# Patient Record
Sex: MALE | Employment: FULL TIME | URBAN - NONMETROPOLITAN AREA
[De-identification: names, ages, dates, MRNs, and addresses within clinical notes are randomized per-mention and may not be internally consistent; named-entity substitution may affect disease eponyms.]

---

## 2019-06-21 LAB
ALBUMIN SERPL-MCNC: 4.7 G/DL
ALP BLD-CCNC: 45 U/L
ALT SERPL-CCNC: 12 U/L
ANION GAP SERPL CALCULATED.3IONS-SCNC: 7 MMOL/L
AST SERPL-CCNC: 21 U/L
BASOPHILS ABSOLUTE: 0.1 /ΜL
BASOPHILS RELATIVE PERCENT: 1.1 %
BILIRUB SERPL-MCNC: 2 MG/DL (ref 0.1–1.4)
BUN BLDV-MCNC: 8 MG/DL
CALCIUM SERPL-MCNC: 9.7 MG/DL
CHLORIDE BLD-SCNC: 106 MMOL/L
CHOLESTEROL, TOTAL: 133 MG/DL
CHOLESTEROL/HDL RATIO: 2
CO2: 28 MMOL/L
CREAT SERPL-MCNC: 0.91 MG/DL
EOSINOPHILS ABSOLUTE: 0.2 /ΜL
EOSINOPHILS RELATIVE PERCENT: 4.1 %
GFR CALCULATED: ABNORMAL
GLUCOSE BLD-MCNC: 85 MG/DL
HCT VFR BLD CALC: 40.2 % (ref 41–53)
HDLC SERPL-MCNC: 68 MG/DL (ref 35–70)
HEMOGLOBIN: 13.9 G/DL (ref 13.5–17.5)
LDL CHOLESTEROL CALCULATED: 56 MG/DL (ref 0–160)
LYMPHOCYTES ABSOLUTE: 1.4 /ΜL
LYMPHOCYTES RELATIVE PERCENT: 31.8 %
MCH RBC QN AUTO: 30.7 PG
MCHC RBC AUTO-ENTMCNC: 34.6 G/DL
MCV RBC AUTO: 88.7 FL
MONOCYTES ABSOLUTE: 0.4 /ΜL
MONOCYTES RELATIVE PERCENT: 9.8 %
NEUTROPHILS ABSOLUTE: 2.3 /ΜL
NEUTROPHILS RELATIVE PERCENT: 53 %
PDW BLD-RTO: 12.8 %
PLATELET # BLD: 213 K/ΜL
PMV BLD AUTO: ABNORMAL FL
POTASSIUM SERPL-SCNC: 5 MMOL/L
RBC # BLD: 4.53 10^6/ΜL
SODIUM BLD-SCNC: 141 MMOL/L
TOTAL PROTEIN: 7.3
TRIGL SERPL-MCNC: 43 MG/DL
VLDLC SERPL CALC-MCNC: 9 MG/DL
WBC # BLD: 4.4 10^3/ML

## 2019-11-19 ENCOUNTER — OFFICE VISIT (OUTPATIENT)
Dept: UROLOGY | Age: 29
End: 2019-11-19
Payer: COMMERCIAL

## 2019-11-19 VITALS
BODY MASS INDEX: 23.84 KG/M2 | DIASTOLIC BLOOD PRESSURE: 68 MMHG | HEIGHT: 73 IN | WEIGHT: 179.9 LBS | SYSTOLIC BLOOD PRESSURE: 110 MMHG

## 2019-11-19 DIAGNOSIS — N52.9 ERECTILE DYSFUNCTION, UNSPECIFIED ERECTILE DYSFUNCTION TYPE: Primary | ICD-10-CM

## 2019-11-19 LAB
BILIRUBIN URINE: NEGATIVE
BLOOD URINE, POC: NEGATIVE
CHARACTER, URINE: CLEAR
COLOR, URINE: YELLOW
GLUCOSE URINE: NEGATIVE MG/DL
KETONES, URINE: NEGATIVE
LEUKOCYTE CLUMPS, URINE: NEGATIVE
NITRITE, URINE: NEGATIVE
PH, URINE: 7.5 (ref 5–9)
PROTEIN, URINE: NEGATIVE MG/DL
SPECIFIC GRAVITY, URINE: 1.01 (ref 1–1.03)
UROBILINOGEN, URINE: 0.2 EU/DL (ref 0–1)

## 2019-11-19 PROCEDURE — 81003 URINALYSIS AUTO W/O SCOPE: CPT | Performed by: UROLOGY

## 2019-11-19 PROCEDURE — 99204 OFFICE O/P NEW MOD 45 MIN: CPT | Performed by: UROLOGY

## 2019-11-19 PROCEDURE — G8420 CALC BMI NORM PARAMETERS: HCPCS | Performed by: UROLOGY

## 2019-11-19 PROCEDURE — G8484 FLU IMMUNIZE NO ADMIN: HCPCS | Performed by: UROLOGY

## 2019-11-19 PROCEDURE — G8427 DOCREV CUR MEDS BY ELIG CLIN: HCPCS | Performed by: UROLOGY

## 2019-11-19 PROCEDURE — 1036F TOBACCO NON-USER: CPT | Performed by: UROLOGY

## 2019-11-19 RX ORDER — SILDENAFIL 100 MG/1
100 TABLET, FILM COATED ORAL DAILY PRN
Qty: 30 TABLET | Refills: 3 | Status: SHIPPED | OUTPATIENT
Start: 2019-11-19 | End: 2021-03-11

## 2019-12-17 ENCOUNTER — OFFICE VISIT (OUTPATIENT)
Dept: UROLOGY | Age: 29
End: 2019-12-17
Payer: COMMERCIAL

## 2019-12-17 VITALS
WEIGHT: 185.8 LBS | BODY MASS INDEX: 24.63 KG/M2 | DIASTOLIC BLOOD PRESSURE: 60 MMHG | HEIGHT: 73 IN | SYSTOLIC BLOOD PRESSURE: 116 MMHG

## 2019-12-17 DIAGNOSIS — N52.9 ERECTILE DYSFUNCTION, UNSPECIFIED ERECTILE DYSFUNCTION TYPE: Primary | ICD-10-CM

## 2019-12-17 LAB
BILIRUBIN URINE: NEGATIVE
BLOOD URINE, POC: NEGATIVE
CHARACTER, URINE: CLEAR
COLOR, URINE: YELLOW
GLUCOSE URINE: NEGATIVE MG/DL
KETONES, URINE: NEGATIVE
LEUKOCYTE CLUMPS, URINE: NEGATIVE
NITRITE, URINE: NEGATIVE
PH, URINE: 7 (ref 5–9)
PROTEIN, URINE: NEGATIVE MG/DL
SPECIFIC GRAVITY, URINE: 1.01 (ref 1–1.03)
UROBILINOGEN, URINE: 0.2 EU/DL (ref 0–1)

## 2019-12-17 PROCEDURE — 1036F TOBACCO NON-USER: CPT | Performed by: UROLOGY

## 2019-12-17 PROCEDURE — G8484 FLU IMMUNIZE NO ADMIN: HCPCS | Performed by: UROLOGY

## 2019-12-17 PROCEDURE — G8420 CALC BMI NORM PARAMETERS: HCPCS | Performed by: UROLOGY

## 2019-12-17 PROCEDURE — 99212 OFFICE O/P EST SF 10 MIN: CPT | Performed by: UROLOGY

## 2019-12-17 PROCEDURE — G8427 DOCREV CUR MEDS BY ELIG CLIN: HCPCS | Performed by: UROLOGY

## 2019-12-17 PROCEDURE — 81003 URINALYSIS AUTO W/O SCOPE: CPT | Performed by: UROLOGY

## 2021-01-28 ENCOUNTER — HOSPITAL ENCOUNTER (EMERGENCY)
Age: 31
Discharge: HOME | End: 2021-01-28
Payer: COMMERCIAL

## 2021-01-28 VITALS
HEIGHT: 73 IN | WEIGHT: 175 LBS | BODY MASS INDEX: 23.19 KG/M2 | TEMPERATURE: 97.6 F | OXYGEN SATURATION: 100 % | DIASTOLIC BLOOD PRESSURE: 76 MMHG | SYSTOLIC BLOOD PRESSURE: 131 MMHG | RESPIRATION RATE: 16 BRPM | HEART RATE: 57 BPM

## 2021-01-28 DIAGNOSIS — G47.00 INSOMNIA, UNSPECIFIED TYPE: Primary | ICD-10-CM

## 2021-01-28 PROCEDURE — 99202 OFFICE O/P NEW SF 15 MIN: CPT

## 2021-01-28 PROCEDURE — 99214 OFFICE O/P EST MOD 30 MIN: CPT | Performed by: NURSE PRACTITIONER

## 2021-01-28 RX ORDER — HYDROXYZINE 50 MG/1
50 TABLET, FILM COATED ORAL EVERY 8 HOURS PRN
Qty: 30 TABLET | Refills: 0 | Status: SHIPPED | OUTPATIENT
Start: 2021-01-28 | End: 2021-03-11

## 2021-01-28 ASSESSMENT — ENCOUNTER SYMPTOMS
SHORTNESS OF BREATH: 0
EYE REDNESS: 0
VOMITING: 0
SORE THROAT: 0
EYE DISCHARGE: 0
DIARRHEA: 0
TROUBLE SWALLOWING: 0
RHINORRHEA: 0
COUGH: 0
NAUSEA: 0

## 2021-01-28 NOTE — ED PROVIDER NOTES
[address]       Chief Complaint   Patient presents with    Insomnia       Nurses Notes reviewed and I agree except as noted in the HPI. HISTORY OF PRESENT ILLNESS   Luz Maria Payne is a 35 y.o. male who presents with c/o insomnia. States onset between 1 and 2 years ago, worsening over the past 1 to 2 weeks. Patient states that symptoms had normally been sporadic, occurring maybe 1-2 times per month. \"It never has been 2 days in a row. \"    He denies difficulty falling asleep. He has trouble staying asleep. Patient will often wake up in the middle of night sometime between 2 and 3 AM.  Patient is at times able to fall back asleep. States he is averaging between 4 and 5 hours of sleep recently. No significant increase in stressors. Patient unable to pinpoint any events around initial onset. History of erectile dysfunction. Patient recently became engaged. States that he was evaluated for erectile dysfunction prior to starting to date his current fiancée. He does note a history of seasonal affective disorder. Insomnia sporadic throughout the entire year, not just during the cold winter months. No suicidal or homicidal thoughts. He does not drink alcohol before bed. He stops caffeine after 2 PM.  Patient drinks mostly water. Patient is active. No improvement with Benadryl or melatonin. REVIEW OF SYSTEMS     Review of Systems   Constitutional: Negative for chills, diaphoresis, fatigue and fever. HENT: Negative for congestion, ear pain, rhinorrhea, sore throat and trouble swallowing. Eyes: Negative for discharge and redness. Respiratory: Negative for cough and shortness of breath. Cardiovascular: Negative for chest pain. Gastrointestinal: Negative for diarrhea, nausea and vomiting. Genitourinary: Negative for decreased urine volume. Musculoskeletal: Negative for neck pain and neck stiffness. Skin: Negative for rash. Neurological: Negative for headaches. Hematological: Negative for adenopathy. Psychiatric/Behavioral: Positive for sleep disturbance. PAST MEDICAL HISTORY   History reviewed. No pertinent past medical history. SURGICAL HISTORY     Patient  has no past surgical history on file. CURRENT MEDICATIONS       Previous Medications    SILDENAFIL (VIAGRA) 100 MG TABLET    Take 1 tablet by mouth daily as needed for Erectile Dysfunction       ALLERGIES     Patient is has No Known Allergies. FAMILY HISTORY     Patient'sfamily history is not on file. SOCIAL HISTORY     Patient  reports that he has never smoked. He has never used smokeless tobacco. He reports current alcohol use. PHYSICAL EXAM     ED TRIAGE VITALS  BP: 131/76, Temp: 97.6 °F (36.4 °C), Pulse: 57, Resp: 16, SpO2: 100 %  Physical Exam  Vitals signs and nursing note reviewed. Constitutional:       General: He is not in acute distress. Appearance: Normal appearance. He is well-developed. He is not ill-appearing, toxic-appearing or diaphoretic. HENT:      Head: Normocephalic and atraumatic. Jaw: No trismus. Right Ear: Hearing, tympanic membrane, ear canal and external ear normal. No mastoid tenderness. No hemotympanum. Tympanic membrane is not perforated, erythematous or bulging. Left Ear: Hearing, tympanic membrane, ear canal and external ear normal. No mastoid tenderness. No hemotympanum. Tympanic membrane is not perforated, erythematous or bulging. Nose: Nose normal.      Mouth/Throat:      Mouth: Mucous membranes are moist.      Pharynx: Oropharynx is clear. Uvula midline. Tonsils: No tonsillar abscesses. Eyes:      General: No scleral icterus. Conjunctiva/sclera: Conjunctivae normal.   Neck:      Musculoskeletal: Normal range of motion and neck supple. Thyroid: No thyromegaly.       Trachea: Trachea normal.   Cardiovascular:      Rate and Rhythm: Normal rate and regular rhythm. No extrasystoles are present. Chest Wall: PMI is not displaced. Heart sounds: Normal heart sounds. No murmur. No friction rub. No gallop. Pulmonary:      Effort: Pulmonary effort is normal. No accessory muscle usage or respiratory distress. Breath sounds: Normal breath sounds. Lymphadenopathy:      Head:      Right side of head: No submental, submandibular, tonsillar, preauricular, posterior auricular or occipital adenopathy. Left side of head: No submental, submandibular, tonsillar, preauricular, posterior auricular or occipital adenopathy. Cervical: No cervical adenopathy. Upper Body:      Right upper body: No supraclavicular adenopathy. Left upper body: No supraclavicular adenopathy. Skin:     General: Skin is warm and dry. Coloration: Skin is not pale. Findings: No rash. Comments: Skin intact, warm and dry to touch, no rashes noted on exposed surfaces. Neurological:      Mental Status: He is alert and oriented to person, place, and time. He is not disoriented. Psychiatric:         Attention and Perception: Attention and perception normal.         Mood and Affect: Mood normal.         Speech: Speech normal.         Behavior: Behavior is cooperative. Thought Content: Thought content normal. Thought content does not include homicidal or suicidal ideation. DIAGNOSTIC RESULTS   Labs: No results found for this visit on 01/28/21. IMAGING:  No orders to display     URGENT CARE COURSE:     Vitals:    01/28/21 0838   BP: 131/76   Pulse: 57   Resp: 16   Temp: 97.6 °F (36.4 °C)   SpO2: 100%   Weight: 175 lb (79.4 kg)   Height: 6' 1\" (1.854 m)       Medications - No data to display  PROCEDURES:  None  FINALIMPRESSION      1. Insomnia, unspecified type        DISPOSITION/PLAN   DISPOSITION Decision To Discharge 01/28/2021 08:41:44 AM  Nontoxic, no distress. Insomnia of unknown cause.     Denies snoring or excessive daytime sleepiness. History of seasonal affective disorder. Atarax as prescribed. Advised to establish care with PCP. If symptoms worsen go to ER. PATIENT REFERRED TO:  Sonia German DO  [facility] [address]  [phone]      Call to establish care. Avoid excess caffeine.     DISCHARGE MEDICATIONS:  New Prescriptions    HYDROXYZINE (ATARAX) 50 MG TABLET    Take 1 tablet by mouth every 8 hours as needed for Itching or Anxiety     Current Discharge Medication List          [address], APRN - CNP  01/28/21 9686

## 2021-01-28 NOTE — ED TRIAGE NOTES
Pt complains that for the past week he has not been able to sleep. States he has only been able to sleep 4-5 hours per night. Denies having any recent increased stressors or life changes. States he has tried melatonin and benadryl, but they didn't help.

## 2021-03-11 ENCOUNTER — OFFICE VISIT (OUTPATIENT)
Dept: FAMILY MEDICINE CLINIC | Age: 31
End: 2021-03-11
Payer: COMMERCIAL

## 2021-03-11 VITALS
RESPIRATION RATE: 14 BRPM | WEIGHT: 180 LBS | SYSTOLIC BLOOD PRESSURE: 110 MMHG | OXYGEN SATURATION: 98 % | DIASTOLIC BLOOD PRESSURE: 62 MMHG | BODY MASS INDEX: 23.75 KG/M2 | TEMPERATURE: 97 F | HEART RATE: 62 BPM

## 2021-03-11 DIAGNOSIS — G47.00 INSOMNIA, UNSPECIFIED TYPE: Primary | ICD-10-CM

## 2021-03-11 PROCEDURE — 1036F TOBACCO NON-USER: CPT | Performed by: FAMILY MEDICINE

## 2021-03-11 PROCEDURE — G8484 FLU IMMUNIZE NO ADMIN: HCPCS | Performed by: FAMILY MEDICINE

## 2021-03-11 PROCEDURE — G8420 CALC BMI NORM PARAMETERS: HCPCS | Performed by: FAMILY MEDICINE

## 2021-03-11 PROCEDURE — 99202 OFFICE O/P NEW SF 15 MIN: CPT | Performed by: FAMILY MEDICINE

## 2021-03-11 PROCEDURE — G8427 DOCREV CUR MEDS BY ELIG CLIN: HCPCS | Performed by: FAMILY MEDICINE

## 2021-03-11 RX ORDER — HYDROXYZINE HYDROCHLORIDE 25 MG/1
25 TABLET, FILM COATED ORAL NIGHTLY PRN
Qty: 90 TABLET | Refills: 3 | Status: SHIPPED | OUTPATIENT
Start: 2021-03-11 | End: 2021-04-10

## 2021-03-11 SDOH — ECONOMIC STABILITY: INCOME INSECURITY: HOW HARD IS IT FOR YOU TO PAY FOR THE VERY BASICS LIKE FOOD, HOUSING, MEDICAL CARE, AND HEATING?: NOT HARD AT ALL

## 2021-03-11 SDOH — ECONOMIC STABILITY: FOOD INSECURITY: WITHIN THE PAST 12 MONTHS, YOU WORRIED THAT YOUR FOOD WOULD RUN OUT BEFORE YOU GOT MONEY TO BUY MORE.: NEVER TRUE

## 2021-03-11 SDOH — ECONOMIC STABILITY: TRANSPORTATION INSECURITY
IN THE PAST 12 MONTHS, HAS LACK OF TRANSPORTATION KEPT YOU FROM MEETINGS, WORK, OR FROM GETTING THINGS NEEDED FOR DAILY LIVING?: NO

## 2021-03-11 SDOH — HEALTH STABILITY: MENTAL HEALTH: HOW OFTEN DO YOU HAVE A DRINK CONTAINING ALCOHOL?: NEVER

## 2021-03-11 ASSESSMENT — PATIENT HEALTH QUESTIONNAIRE - PHQ9
SUM OF ALL RESPONSES TO PHQ9 QUESTIONS 1 & 2: 0
1. LITTLE INTEREST OR PLEASURE IN DOING THINGS: 0
2. FEELING DOWN, DEPRESSED OR HOPELESS: 0
SUM OF ALL RESPONSES TO PHQ QUESTIONS 1-9: 0

## 2021-03-11 NOTE — PROGRESS NOTES
Family medicine clinic new patient    Ernie Cummings presents to establish care and for the following complains/issues:   Chief Complaint   Patient presents with    New Patient     establish    Other     troubles sleeping         HPI: Patient reports insomnia going on for the past 2 to 3 months. Reports that he has trouble staying asleep and falling asleep. Got a prescription for hydroxyzine which is significantly improving his insomnia. He would like to stay on this. Takes 25 mg nightly without significant side effects. Melatonin and Benadryl have not worked for him in the past.  No other significant issues with anxiety. History reviewed. No pertinent past medical history. History reviewed. No pertinent surgical history. Social History     Tobacco History     Smoking Status  Never Smoker    Smokeless Tobacco Use  Never Used          Alcohol History     Alcohol Use Status  Not Currently          Drug Use     Drug Use Status  Never          Sexual Activity     Sexually Active  Not Asked              Prior to Visit Medications    Medication Sig Taking? Authorizing Provider   hydrOXYzine (ATARAX) 25 MG tablet Take 1 tablet by mouth nightly as needed (insomnia) Yes Sod[place]ille Emanuels, DO      No Known Allergies  Family History   Problem Relation Age of Onset    High Blood Pressure Father         Vitals:    03/11/21 1514   BP: 110/62   Pulse: 62   Resp: 14   Temp: 97 °F (36.1 °C)   SpO2: 98%     Physical Exam  Constitutional:       Appearance: He is well-developed. HENT:      Head: Normocephalic and atraumatic. Right Ear: External ear normal.      Left Ear: External ear normal.      Nose: Nose normal.   Eyes:      Pupils: Pupils are equal, round, and reactive to light. Neck:      Musculoskeletal: Normal range of motion and neck supple. Cardiovascular:      Rate and Rhythm: Normal rate and regular rhythm. Heart sounds: Normal heart sounds.    Pulmonary:      Effort: Pulmonary effort is normal. Breath sounds: Normal breath sounds. Abdominal:      General: There is no distension. Palpations: Abdomen is soft. Tenderness: There is no abdominal tenderness. Musculoskeletal: Normal range of motion. Skin:     General: Skin is warm and dry. Neurological:      Mental Status: He is alert and oriented to person, place, and time. Psychiatric:         Behavior: Behavior normal.         Thought Content: Thought content normal.          Results for orders placed or performed in visit on 12/17/19   POCT Urinalysis No Micro (Auto)   Result Value Ref Range    Glucose, Ur Negative NEGATIVE mg/dl    Bilirubin Urine Negative     Ketones, Urine Negative NEGATIVE    Specific Gravity, Urine 1.010 1.002 - 1.03    Blood, UA POC Negative NEGATIVE    pH, Urine 7.00 5.0 - 9.0    Protein, Urine Negative NEGATIVE mg/dl    Urobilinogen, Urine 0.20 0.0 - 1.0 eu/dl    Nitrite, Urine Negative NEGATIVE    Leukocyte Clumps, Urine Negative NEGATIVE    Color, Urine Yellow YELLOW-STR    Character, Urine Clear [id number]        A/P:  Baldomero Flores was seen today for new patient and other. Diagnoses and all orders for this visit:    Insomnia, unspecified type    Other orders  -     hydrOXYzine (ATARAX) 25 MG tablet; Take 1 tablet by mouth nightly as needed (insomnia)    Insomnia is controlled with hydroxyzine 25 mg p.o. nightly as needed. Continue this. Follow-up in 1 year      Return in about 1 year (around 3/11/2022).      Barbara Dominguez

## 2022-01-01 NOTE — PROGRESS NOTES
Lashell Rockwell (:  1990) is a 28 y.o. male,Established patient, here for evaluation of the following chief complaint(s): Mass         ASSESSMENT/PLAN:  1. Ganglion cyst of wrist, left  -     WOODROW Diallo DO, Orthopedic Surgery, [facility]  2. Insomnia, unspecified type    Spoke about sleep hygiene at length. Patient says above regimen controls symptoms and wants to continue for now, advised about risks and benefits. Due to persistent symptoms of ganglion cyst, will refer to orthopaedics for removal.      Return in about 6 months (around 7/3/2022). Subjective   SUBJECTIVE/OBJECTIVE:  HPI     Insomnia  Patient states symptoms have resolved since last visit. Says his sleep hygiene has improved. Only occasionally using atarax now. No further needs.       Ganglion Cyst  Noted on left lateral dorsal wrist.  Has been on and off for years. Slowly enlarging during this time and worst over past month. Says he has had some pain from this. Has tried icing and wrapping without signifiant improvement. Discussed risks and benefits of conservative management and patient requesting surgical removal.      Review of Systems   Constitutional: Negative for chills. HENT: Negative for congestion and ear pain. Eyes: Negative for pain and visual disturbance. Respiratory: Negative for cough and shortness of breath. Cardiovascular: Negative for chest pain and leg swelling. Gastrointestinal: Negative for abdominal pain. Musculoskeletal: Positive for arthralgias. Negative for back pain and neck pain. Psychiatric/Behavioral: Negative for behavioral problems and sleep disturbance. Objective   Physical Exam  Vitals and nursing note reviewed. Constitutional:       Appearance: Normal appearance. HENT:      Head: Normocephalic and atraumatic. Nose: Nose normal.      Mouth/Throat:      Mouth: Mucous membranes are moist.      Pharynx: Oropharynx is clear.    Eyes: Extraocular Movements: Extraocular movements intact. Pupils: Pupils are equal, round, and reactive to light. Cardiovascular:      Rate and Rhythm: Normal rate and regular rhythm. Pulses: Normal pulses. Heart sounds: Normal heart sounds. Pulmonary:      Effort: Pulmonary effort is normal.      Breath sounds: Normal breath sounds. Abdominal:      General: Abdomen is flat. Bowel sounds are normal.   Musculoskeletal:         General: Swelling (Left lateral dorsal wrist, ganglion cyst approximately [id number]) present. No signs of injury. Normal range of motion. Cervical back: Normal range of motion and neck supple. Skin:     General: Skin is warm and dry. Capillary Refill: Capillary refill takes less than 2 seconds. Neurological:      General: No focal deficit present. Mental Status: He is alert and oriented to person, place, and time. Mental status is at baseline. Psychiatric:         Mood and Affect: Mood normal.         Behavior: Behavior normal.                  An electronic signature was used to authenticate this note.     --Emil Prado MD

## 2022-01-02 PROBLEM — G47.00 INSOMNIA: Status: ACTIVE | Noted: 2022-01-02

## 2022-01-03 ENCOUNTER — OFFICE VISIT (OUTPATIENT)
Dept: FAMILY MEDICINE CLINIC | Age: 32
End: 2022-01-03
Payer: COMMERCIAL

## 2022-01-03 VITALS
HEART RATE: 74 BPM | HEIGHT: 73 IN | WEIGHT: 176 LBS | TEMPERATURE: 97.2 F | OXYGEN SATURATION: 100 % | BODY MASS INDEX: 23.33 KG/M2 | SYSTOLIC BLOOD PRESSURE: 130 MMHG | DIASTOLIC BLOOD PRESSURE: 80 MMHG | RESPIRATION RATE: 16 BRPM

## 2022-01-03 DIAGNOSIS — M67.432 GANGLION CYST OF WRIST, LEFT: Primary | ICD-10-CM

## 2022-01-03 DIAGNOSIS — G47.00 INSOMNIA, UNSPECIFIED TYPE: ICD-10-CM

## 2022-01-03 PROCEDURE — G8484 FLU IMMUNIZE NO ADMIN: HCPCS | Performed by: FAMILY MEDICINE

## 2022-01-03 PROCEDURE — G8420 CALC BMI NORM PARAMETERS: HCPCS | Performed by: FAMILY MEDICINE

## 2022-01-03 PROCEDURE — G8428 CUR MEDS NOT DOCUMENT: HCPCS | Performed by: FAMILY MEDICINE

## 2022-01-03 PROCEDURE — 99214 OFFICE O/P EST MOD 30 MIN: CPT | Performed by: FAMILY MEDICINE

## 2022-01-03 PROCEDURE — 1036F TOBACCO NON-USER: CPT | Performed by: FAMILY MEDICINE

## 2022-01-03 RX ORDER — HYDROXYZINE HYDROCHLORIDE 25 MG/1
25 TABLET, FILM COATED ORAL NIGHTLY PRN
Qty: 90 TABLET | Refills: 3 | Status: CANCELLED | OUTPATIENT
Start: 2022-01-03 | End: 2022-02-02

## 2022-01-03 ASSESSMENT — ENCOUNTER SYMPTOMS
ABDOMINAL PAIN: 0
EYE PAIN: 0
BACK PAIN: 0
SHORTNESS OF BREATH: 0
COUGH: 0

## 2022-01-03 NOTE — PROGRESS NOTES
S: 28 y.o. male with mass on the wrist.      HPI: please see resident note for HPI and ROS. BP Readings from Last 3 Encounters:   01/03/22 130/80   03/11/21 110/62   01/28/21 131/76     Wt Readings from Last 3 Encounters:   01/03/22 176 lb (79.8 kg)   03/11/21 180 lb (81.6 kg)   01/28/21 175 lb (79.4 kg)       O: VS:  height is 6' 1\" (1.854 m) and weight is 176 lb (79.8 kg). His temporal temperature is 97.2 °F (36.2 °C). His blood pressure is 130/80 and his pulse is 74. His respiration is 16 and oxygen saturation is 100%. AAO/NAD, appropriate affect for mood       Diagnosis Orders   1. Ganglion cyst of wrist, left  WOODROW - Shelbi Bower DO, Orthopedic Surgery, [address]   2. Insomnia, unspecified type         Plan:  Refer to [facility]. Insomnia controlled with atarax. Health Maintenance Due   Topic Date Due    Hepatitis C screen  Never done    Varicella vaccine (1 of 2 - 2-dose childhood series) Never done    COVID-19 Vaccine (1) Never done    HIV screen  Never done    DTaP/Tdap/Td vaccine (1 - Tdap) Never done    Flu vaccine (1) Never done       Attending Physician Statement  I have discussed the case, including pertinent history and exam findings with the resident. I also have seen the patient and performed key portions of the examination. I agree with the documented assessment and plan as documented by the resident.         Janse Quezada DO 1/3/2022 8:43 AM

## 2022-01-14 ENCOUNTER — HOSPITAL ENCOUNTER (EMERGENCY)
Age: 32
Discharge: HOME | End: 2022-01-14
Attending: EMERGENCY MEDICINE
Payer: COMMERCIAL

## 2022-01-14 VITALS
HEART RATE: 85 BPM | TEMPERATURE: 97.8 F | RESPIRATION RATE: 14 BRPM | OXYGEN SATURATION: 99 % | DIASTOLIC BLOOD PRESSURE: 66 MMHG | HEIGHT: 73 IN | SYSTOLIC BLOOD PRESSURE: 123 MMHG | WEIGHT: 175 LBS | BODY MASS INDEX: 23.19 KG/M2

## 2022-01-14 DIAGNOSIS — J06.9 VIRAL URI: Primary | ICD-10-CM

## 2022-01-14 PROCEDURE — 99213 OFFICE O/P EST LOW 20 MIN: CPT | Performed by: EMERGENCY MEDICINE

## 2022-01-14 PROCEDURE — 99213 OFFICE O/P EST LOW 20 MIN: CPT

## 2022-01-14 RX ORDER — CETIRIZINE HYDROCHLORIDE 10 MG/1
10 TABLET ORAL DAILY
Qty: 30 TABLET | Refills: 0 | Status: SHIPPED | OUTPATIENT
Start: 2022-01-14 | End: 2022-02-13

## 2022-01-14 ASSESSMENT — ENCOUNTER SYMPTOMS
TROUBLE SWALLOWING: 0
COUGH: 0
VOMITING: 0
SHORTNESS OF BREATH: 0
EYE PAIN: 0
CONSTIPATION: 0
ABDOMINAL PAIN: 0
NAUSEA: 0
DIARRHEA: 0
SORE THROAT: 1
BLOOD IN STOOL: 0

## 2022-01-14 ASSESSMENT — PAIN DESCRIPTION - PROGRESSION: CLINICAL_PROGRESSION: NOT CHANGED

## 2022-01-14 ASSESSMENT — PAIN - FUNCTIONAL ASSESSMENT: PAIN_FUNCTIONAL_ASSESSMENT: ACTIVITIES ARE NOT PREVENTED

## 2022-01-14 ASSESSMENT — PAIN SCALES - GENERAL: PAINLEVEL_OUTOF10: 4

## 2022-01-14 ASSESSMENT — PAIN DESCRIPTION - PAIN TYPE: TYPE: ACUTE PAIN

## 2022-01-14 ASSESSMENT — PAIN DESCRIPTION - ONSET: ONSET: GRADUAL

## 2022-01-14 ASSESSMENT — PAIN DESCRIPTION - LOCATION: LOCATION: THROAT

## 2022-01-14 ASSESSMENT — PAIN DESCRIPTION - DESCRIPTORS: DESCRIPTORS: SORE

## 2022-01-14 ASSESSMENT — PAIN DESCRIPTION - FREQUENCY: FREQUENCY: CONTINUOUS

## 2022-01-14 NOTE — ED PROVIDER NOTES
[facility]  Urgent Care Encounter       CHIEF COMPLAINT       Chief Complaint   Patient presents with    Fever    Pharyngitis       Nurses Notes reviewed and I agree except as noted in the HPI. HISTORY OF PRESENT ILLNESS   Haris Fitch is a 28 y.o. male who presents with complaints of 3 days of mild myalgias, fevers with T-max 100.4, and sore throat. Patient reports that he works from home and does not think he had any COVID exposure. He states he has been taking ibuprofen at home with improvement in his symptoms. He denies any cough, shortness of breath, chest tightness, abdominal pain, nausea vomiting, diarrhea, constipation. He states that he has been able to work out throughout his symptoms. He does not require COVID test for work. He does have a PCP. HPI    REVIEW OF SYSTEMS     Review of Systems   Constitutional: Positive for fever. Negative for chills and fatigue. HENT: Positive for sore throat. Negative for ear pain, postnasal drip and trouble swallowing. Eyes: Negative for pain and visual disturbance. Respiratory: Negative for cough and shortness of breath. Cardiovascular: Negative for chest pain and palpitations. Gastrointestinal: Negative for abdominal pain, blood in stool, constipation, diarrhea, nausea and vomiting. Genitourinary: Negative for dysuria. Musculoskeletal: Positive for myalgias. Skin: Negative for rash. Neurological: Negative for headaches. PAST MEDICAL HISTORY   History reviewed. No pertinent past medical history. SURGICALHISTORY     Patient  has no past surgical history on file. CURRENT MEDICATIONS       Previous Medications    No medications on file       ALLERGIES     Patient is has No Known Allergies. Patients   There is no immunization history on file for this patient. FAMILY HISTORY     Patient's family history includes High Blood Pressure in his father.     SOCIAL HISTORY     Patient  reports that he has never smoked. He has never used smokeless tobacco. He reports previous alcohol use. He reports that he does not use drugs. PHYSICAL EXAM     ED TRIAGE VITALS  BP: 123/66, Temp: 97.8 °F (36.6 °C), Pulse: 85, Resp: 14, SpO2: 99 %,Estimated body mass index is 23.09 kg/m² as calculated from the following:    Height as of this encounter: 6' 1\" (1.854 m). Weight as of this encounter: 175 lb (79.4 kg). ,No LMP for male patient. Physical Exam  Vitals and nursing note reviewed. Constitutional:       General: He is not in acute distress. Appearance: He is well-developed. He is not diaphoretic. HENT:      Head: Normocephalic and atraumatic. Right Ear: External ear normal.      Left Ear: External ear normal.      Nose: Nose normal. No congestion or rhinorrhea. Mouth/Throat:      Pharynx: No oropharyngeal exudate or posterior oropharyngeal erythema. Eyes:      General: No scleral icterus. Right eye: No discharge. Left eye: No discharge. Conjunctiva/sclera: Conjunctivae normal.   Cardiovascular:      Rate and Rhythm: Normal rate and regular rhythm. Heart sounds: Normal heart sounds. No murmur heard. Pulmonary:      Effort: Pulmonary effort is normal.      Breath sounds: Normal breath sounds. Musculoskeletal:      Cervical back: Normal range of motion. Skin:     General: Skin is warm and dry. Findings: No erythema or rash. Neurological:      Mental Status: He is alert and oriented to person, place, and time. Cranial Nerves: No cranial nerve deficit. Psychiatric:         Behavior: Behavior normal.         Thought Content: Thought content normal.         Judgment: Judgment normal.         DIAGNOSTIC RESULTS     Labs:No results found for this visit on 01/14/22.     IMAGING:    No orders to display         EKG: n/a      URGENT CARE COURSE:     Vitals:    01/14/22 0824   BP: 123/66   Pulse: 85   Resp: 14   Temp: 97.8 °F (36.6 °C)   TempSrc: Temporal   SpO2: 99%   Weight: 175 lb (79.4 kg)   Height: 6' 1\" (1.854 m)       Medications - No data to display         PROCEDURES:  None    FINAL IMPRESSION      1. Viral URI          DISPOSITION/ PLAN     Vitals are stable. Clinical history is most consistent with a viral URI. No diagnostic testing done today in the urgent care on this patient considering his symptoms are very mild. He is advised to increase oral intake, use Tylenol as needed, take daily Zyrtec, and continue to monitor symptoms. If any worsening symptoms or need for testing, he may follow-up with his PCP or follow-up in the ER. He is advised to stay home through day 5 of his symptoms and if fever free for at least 24 hours on day 6, may go out of the house as long as he can wear well fitting mask. Patient voices understanding of above plan. Printed information regarding his diagnoses are given to the patient.   He is being discharged home in stable condition at this time      PATIENT REFERRED TO:  [place] [address] / [facility] [id number]      DISCHARGE MEDICATIONS:  New Prescriptions    CETIRIZINE (ZYRTEC) 10 MG TABLET    Take 1 tablet by mouth daily       Discontinued Medications    No medications on file       Current Discharge Medication List          Laxmi Rausch MD    (Please note that portions of this note were completed with a voice recognition program. Efforts were made to edit the dictations but occasionally words are mis-transcribed.)           Laxmi Rausch MD  Resident  01/14/22 [id number]

## 2022-01-14 NOTE — ED PROVIDER NOTES
[facility] Encounter      [facility]       Chief Complaint   Patient presents with    Fever    Pharyngitis       Nurses Notes reviewed and I agree except as noted in the HPI. HISTORY OF PRESENT ILLNESS   Jodie Edward is a 28 y.o. male who presents fever and sore throat      I, Melly No MD,  personally performed and participated in key or critical portions of the evaluation and management including personally performing the exam and medical decision making. I verify the accuracy of the documentation by the resident.   Please refer to resident note for specifics and further details of this urgent care evaluation    Electronically signed by Jodie Antonio MD on 1/14/2022 at 8:33 AM     Jodie Antonio MD  01/14/22 [id number]

## 2022-07-26 ENCOUNTER — OFFICE VISIT (OUTPATIENT)
Dept: FAMILY MEDICINE CLINIC | Age: 32
End: 2022-07-26
Payer: COMMERCIAL

## 2022-07-26 VITALS
HEART RATE: 64 BPM | RESPIRATION RATE: 16 BRPM | HEIGHT: 72 IN | BODY MASS INDEX: 22.67 KG/M2 | WEIGHT: 167.4 LBS | OXYGEN SATURATION: 99 % | DIASTOLIC BLOOD PRESSURE: 80 MMHG | SYSTOLIC BLOOD PRESSURE: 112 MMHG | TEMPERATURE: 96.7 F

## 2022-07-26 DIAGNOSIS — N48.9 PENILE LESION: Primary | ICD-10-CM

## 2022-07-26 DIAGNOSIS — M67.432 GANGLION CYST OF WRIST, LEFT: ICD-10-CM

## 2022-07-26 DIAGNOSIS — G47.00 INSOMNIA, UNSPECIFIED TYPE: ICD-10-CM

## 2022-07-26 PROCEDURE — 99214 OFFICE O/P EST MOD 30 MIN: CPT | Performed by: STUDENT IN AN ORGANIZED HEALTH CARE EDUCATION/TRAINING PROGRAM

## 2022-07-26 SDOH — ECONOMIC STABILITY: FOOD INSECURITY: WITHIN THE PAST 12 MONTHS, YOU WORRIED THAT YOUR FOOD WOULD RUN OUT BEFORE YOU GOT MONEY TO BUY MORE.: NEVER TRUE

## 2022-07-26 SDOH — ECONOMIC STABILITY: FOOD INSECURITY: WITHIN THE PAST 12 MONTHS, THE FOOD YOU BOUGHT JUST DIDN'T LAST AND YOU DIDN'T HAVE MONEY TO GET MORE.: NEVER TRUE

## 2022-07-26 ASSESSMENT — ENCOUNTER SYMPTOMS
BLOOD IN STOOL: 0
CONSTIPATION: 0
SHORTNESS OF BREATH: 0
NAUSEA: 0
TROUBLE SWALLOWING: 0
COUGH: 0
VOMITING: 0
EYE PAIN: 0
DIARRHEA: 0
ABDOMINAL PAIN: 0

## 2022-07-26 ASSESSMENT — PATIENT HEALTH QUESTIONNAIRE - PHQ9
1. LITTLE INTEREST OR PLEASURE IN DOING THINGS: 0
SUM OF ALL RESPONSES TO PHQ QUESTIONS 1-9: 1
SUM OF ALL RESPONSES TO PHQ QUESTIONS 1-9: 1
SUM OF ALL RESPONSES TO PHQ9 QUESTIONS 1 & 2: 1
2. FEELING DOWN, DEPRESSED OR HOPELESS: 1
SUM OF ALL RESPONSES TO PHQ QUESTIONS 1-9: 1
SUM OF ALL RESPONSES TO PHQ QUESTIONS 1-9: 1

## 2022-07-26 ASSESSMENT — SOCIAL DETERMINANTS OF HEALTH (SDOH): HOW HARD IS IT FOR YOU TO PAY FOR THE VERY BASICS LIKE FOOD, HOUSING, MEDICAL CARE, AND HEATING?: NOT HARD AT ALL

## 2022-07-26 NOTE — PROGRESS NOTES
Health Maintenance Due   Topic Date Due    COVID-19 Vaccine (1) Never done    Varicella vaccine (1 of 2 - 2-dose childhood series) Never done    HIV screen  Never done    Hepatitis C screen  Never done    DTaP/Tdap/Td vaccine (1 - Tdap) Never done    Depression Screen  03/11/2022

## 2022-07-26 NOTE — PROGRESS NOTES
S: 33 y.o. male with   Chief Complaint   Patient presents with    Follow-up     Ganglion cyst. Patient didn't have removed         Reviewed hx and ROS in detail with resident prior to exam.  See notes for additional details. BP Readings from Last 3 Encounters:   07/26/22 112/80   01/14/22 123/66   01/03/22 130/80     Wt Readings from Last 3 Encounters:   07/26/22 167 lb 6.4 oz (75.9 kg)   01/14/22 175 lb (79.4 kg)   01/03/22 176 lb (79.8 kg)           O: VS:  height is 6' (1.829 m) and weight is 167 lb 6.4 oz (75.9 kg). His skin temperature is 96.7 °F (35.9 °C) (abnormal). His blood pressure is 112/80 and his pulse is 64. His respiration is 16 and oxygen saturation is 99%. AAO/NAD, appropriate affect for mood  Patient with several roughly 2 mm slightly raised mildly erythematous lesions on the glans of the penis no central umbilication noted nontender     Diagnosis Orders   1. Penile lesion        2. Ganglion cyst of wrist, left        3. Insomnia, unspecified type            Plan  Penile lesion-sounds like something allergic in nature since lesions resolved completely with hydrocortisone cream refer to urology    Ganglion cyst patient patient feels that it is not bothering him enough at this time to have surgery discussed that watching it conservatively is certainly an option patient will let us know if his symptoms change or if he would like to proceed with surgery    Insomnia-patient doing quite well with hydroxyzine no side effects continue as needed      Health Maintenance Due   Topic Date Due    Varicella vaccine (1 of 2 - 2-dose childhood series) Never done    HIV screen  Never done    Hepatitis C screen  Never done    DTaP/Tdap/Td vaccine (1 - Tdap) Never done    Depression Screen  03/11/2022         Attending Physician Statement  I have discussed the case, including pertinent history and exam findings with the resident. I also have seen the patient and performed key portions of the examination.   I agree with the documented assessment and plan as documented by the resident.   GC modifier added to this encounter      Elizabeth Rodas MD 7/26/2022 8:42 AM

## 2022-07-26 NOTE — PROGRESS NOTES
[id number] [facility] [address] [facility] [address]  Dept: [phone]  Loc: [address] (:  1990) is a 33 y.o. male,Established patient, here for evaluation of the following chief complaint(s):  Follow-up (Ganglion cyst. Patient didn't have removed)      ASSESSMENT/PLAN:  1. Penile lesion  - Chronic penile lesions of the penis for roughly 1 year, small 1 to 2 mm erythematic macular in appearance. Possible slightly macular with induration, difficult to discern. No ulceration identified.  - Considering likely allergic type reaction due to resolution with hydrocortisone cream application. Patient would like further evaluation will refer to urology for further assessment and recommendations. -     Rowdy Maldonado MD, Urology, [address]  2. Ganglion cyst of wrist, left   -Currently asymptomatic, patient already addressed with Dr. Kya Guzman at [facility]. We will continue with supportive conservative management at this time. 3. Insomnia, unspecified type   - Stable occasional insomnia, continue with hydroxyzine as needed  - Sleep hygiene discussed last visit. Previous attempts with melatonin and Benadryl in the past which has not worked. Was started on hydroxyzine 3/11/2021 with significant improvement. Melatonin and Benadryl attempted in the past, has not worked  Was started on hydroxyzine 3/11/2021 PRN    Return in about 3 months (around 10/26/2022) for Genital lesions. SUBJECTIVE/OBJECTIVE:  HPI  Presents for follow-up of his ganglion cyst on his left wrist that he followed up with [facility] with Dr. Patrick Freedman. He states the cyst is decreased in size no longer causing problems and he has decided to leave it alone for the time being. His insomnia is much improved, only occasionally symptomatic that he treats as needed with hydroxyzine.     Patient also presents with concern of several penile lesions over the last year that presented 2 weeks prior to getting . Lesions typically appear in the same location around the glans of the penis. Patient reports that he used barrier protection with previous sexual encounters. Utilization of K-Y jelly. Denies dysuria, hematuria or pain at the lesion sites. Wife is not experiencing genitourinary symptoms. Review of Systems   Constitutional:  Negative for chills, fatigue and fever. HENT:  Negative for ear pain, postnasal drip and trouble swallowing. Eyes:  Negative for pain and visual disturbance. Respiratory:  Negative for cough and shortness of breath. Cardiovascular:  Negative for chest pain and palpitations. Gastrointestinal:  Negative for abdominal pain, blood in stool, constipation, diarrhea, nausea and vomiting. Genitourinary:  Positive for genital sores. Negative for difficulty urinating, dysuria, frequency, penile discharge, penile pain, penile swelling, scrotal swelling and urgency. Musculoskeletal:         Ganglion cyst on the left wrist   Skin:  Negative for rash. Neurological:  Negative for headaches. Physical Exam  Vitals and nursing note reviewed. Constitutional:       General: He is not in acute distress. Appearance: He is well-developed. He is not diaphoretic. HENT:      Head: Normocephalic and atraumatic. Right Ear: External ear normal.      Left Ear: External ear normal.      Nose: Nose normal.   Eyes:      General: No scleral icterus. Right eye: No discharge. Left eye: No discharge. Conjunctiva/sclera: Conjunctivae normal.   Cardiovascular:      Rate and Rhythm: Normal rate and regular rhythm. Heart sounds: Normal heart sounds. No murmur heard. Pulmonary:      Effort: Pulmonary effort is normal.      Breath sounds: Normal breath sounds. Abdominal:      General: Abdomen is flat. There is no distension. Palpations: Abdomen is soft. Tenderness: There is no abdominal tenderness. Genitourinary:     Comments: Several small 1 to 2 mm red erythematic lesions on the left shaft and left base of the head of the penis. Several noted on the inferior aspect of the urethra. Musculoskeletal:      Cervical back: Normal range of motion. Comments: 1.5 cm firm raised nodule on posterior aspect of left wrist, nontender to palpation. Skin:     General: Skin is warm and dry. Findings: No erythema or rash. Neurological:      Mental Status: He is alert and oriented to person, place, and time. Cranial Nerves: No cranial nerve deficit. Psychiatric:         Behavior: Behavior normal.         Thought Content: Thought content normal.         Judgment: Judgment normal.         Vitals:    07/26/22 0807   BP: 112/80   Site: Left Upper Arm   Position: Sitting   Cuff Size: Medium Adult   Pulse: 64   Resp: 16   Temp: (!) 96.7 °F (35.9 °C)   TempSrc: Skin   SpO2: 99%   Weight: 167 lb 6.4 oz (75.9 kg)   Height: 6' (1.829 m)         An electronic signature was used to authenticate this note.     --Lucy Romero MD

## 2022-08-17 ENCOUNTER — OFFICE VISIT (OUTPATIENT)
Dept: UROLOGY | Age: 32
End: 2022-08-17
Payer: COMMERCIAL

## 2022-08-17 VITALS
WEIGHT: 169 LBS | HEIGHT: 72 IN | SYSTOLIC BLOOD PRESSURE: 128 MMHG | DIASTOLIC BLOOD PRESSURE: 80 MMHG | BODY MASS INDEX: 22.89 KG/M2

## 2022-08-17 DIAGNOSIS — N48.1 ZOON'S BALANITIS: Primary | ICD-10-CM

## 2022-08-17 PROCEDURE — 1036F TOBACCO NON-USER: CPT | Performed by: UROLOGY

## 2022-08-17 PROCEDURE — G8427 DOCREV CUR MEDS BY ELIG CLIN: HCPCS | Performed by: UROLOGY

## 2022-08-17 PROCEDURE — 99204 OFFICE O/P NEW MOD 45 MIN: CPT | Performed by: UROLOGY

## 2022-08-17 PROCEDURE — G8420 CALC BMI NORM PARAMETERS: HCPCS | Performed by: UROLOGY

## 2022-08-17 RX ORDER — FLUOROURACIL 50 MG/G
CREAM TOPICAL
Qty: 40 G | Refills: 1 | Status: SHIPPED | OUTPATIENT
Start: 2022-08-17

## 2022-08-17 RX ORDER — HYDROXYZINE HYDROCHLORIDE 25 MG/1
25 TABLET, FILM COATED ORAL PRN
COMMUNITY
End: 2022-10-10

## 2022-08-17 NOTE — PROGRESS NOTES
Dr. Frankey Letters, MD  [facility])  Urology Clinic  New Patient Visit      Patient:  Chi Stovall  YOB: 1990  Date: 8/17/2022    HISTORY OF PRESENT ILLNESS:   The patient is a 33 y.o. male who presents today for evaluation of the following problem(s): Zoons balanitis. Overall the problem(s) : are worsening. Associated Symptoms: No dysuria, gross hematuria. Pain Severity: 0/10    Summary of old records:   None    Imaging/Labs reviewed during today's visit:  I have independently reviewed and verified the following films during today's visit. Noen    Last several PSA's:  No results found for: PSA    Last total testosterone:  No results found for: TESTOSTERONE    Urinalysis today:  No results found for this visit on 08/17/22. Last BUN and creatinine:  Lab Results   Component Value Date    BUN 8 06/21/2019     Lab Results   Component Value Date    CREATININE 0.91 06/21/2019       Imaging Reviewed during this Office Visit:   (results were independently reviewed by physician and radiology report verified)    PAST MEDICAL, FAMILY AND SOCIAL HISTORY:  No past medical history on file. No past surgical history on file. Family History   Problem Relation Age of Onset    High Blood Pressure Father      Outpatient Medications Marked as Taking for the 8/17/22 encounter (Office Visit) with Frankey Letters, MD   Medication Sig Dispense Refill    hydrOXYzine HCl (ATARAX) 25 MG tablet Take 25 mg by mouth as needed for Itching      Multiple Vitamin (MULTI-VITAMIN PO) Take by mouth daily         Patient has no known allergies.   Social History     Tobacco Use   Smoking Status Never   Smokeless Tobacco Never       Social History     Substance and Sexual Activity   Alcohol Use Not Currently       REVIEW OF SYSTEMS:  Constitutional: negative  Eyes: negative  Respiratory: negative  Cardiovascular: negative  Gastrointestinal: negative  Musculoskeletal: negative  Genitourinary: negative except for what is in HPI  Skin: negative   Neurological: negative  Hematological/Lymphatic: negative  Psychological: negative    Physical Exam:    This a 33 y.o. male   Vitals:    08/17/22 0855   BP: 128/80     Constitutional: Patient in no acute distress; Neuro: alert and oriented to person place and time. Psych: Mood and affect normal.  Skin: Normal  Lungs: Respiratory effort normal  Cardiovascular:  Normal peripheral pulses  Abdomen: Soft, non-tender, non-distended with no CVA, flank pain, hepatosplenomegaly or hernia. Kidneys normal.  Bladder non-tender and not distended. Lymphatics: no palpable lymphadenopathy  Penis normal and circumcised  Urethral meatus normal  Scrotal exam normal      Assessment and Plan      1. Zoon's balanitis           Plan:      No follow-ups on file. Zoons or plasma cell balanitis. Discussed all options  Will trial on 5-FU.          Dr. Roberto Mccormick MD

## 2022-10-10 RX ORDER — HYDROXYZINE HYDROCHLORIDE 25 MG/1
TABLET, FILM COATED ORAL
Qty: 90 TABLET | Refills: 0 | Status: SHIPPED | OUTPATIENT
Start: 2022-10-10

## 2022-10-10 NOTE — TELEPHONE ENCOUNTER
Patient's last appointment was : 7/26/2022  Patient's next appointment is :   Future Appointments   Date Time Provider Sola Arechiga   10/17/2022  7:45 AM Romy Lozano  [facility]     Last refilled:08/17/22    No results found for: [facility]  Lab Results   Component Value Date    CHOL 133 06/21/2019    TRIG 43 06/21/2019    HDL 68 06/21/2019    LDLCALC 56 06/21/2019     Lab Results   Component Value Date     06/21/2019    K 5.0 06/21/2019     06/21/2019    CO2 28 06/21/2019    BUN 8 06/21/2019    CREATININE 0.91 06/21/2019    GLUCOSE 85 06/21/2019    CALCIUM 9.7 06/21/2019    LABALBU 4.7 06/21/2019    BILITOT 2.0 (A) 06/21/2019    ALKPHOS 45 06/21/2019    AST 21 06/21/2019    ALT 12 06/21/2019    LABGLOM  06/21/2019      Comment:      eGFR>60     No results found for: TSH, I2WWSVA, Q4PIGAG, THYROIDAB, FT3, T4FREE  Lab Results   Component Value Date    WBC 4.4 06/21/2019    HGB 13.9 06/21/2019    HCT 40.2 (A) 06/21/2019    MCV 88.7 06/21/2019     06/21/2019

## 2022-10-10 NOTE — TELEPHONE ENCOUNTER
Script sent to pharmacy.   Thank you,  Electronically signed by Katlyn Roberts MD on 10/10/2022 at 3:58 PM

## 2022-10-17 ENCOUNTER — OFFICE VISIT (OUTPATIENT)
Dept: UROLOGY | Age: 32
End: 2022-10-17
Payer: COMMERCIAL

## 2022-10-17 VITALS
DIASTOLIC BLOOD PRESSURE: 74 MMHG | SYSTOLIC BLOOD PRESSURE: 108 MMHG | WEIGHT: 169 LBS | BODY MASS INDEX: 22.89 KG/M2 | HEIGHT: 72 IN

## 2022-10-17 DIAGNOSIS — N48.1 ZOON'S BALANITIS: Primary | ICD-10-CM

## 2022-10-17 DIAGNOSIS — N48.89 PENILE PAIN: ICD-10-CM

## 2022-10-17 PROCEDURE — G8484 FLU IMMUNIZE NO ADMIN: HCPCS | Performed by: UROLOGY

## 2022-10-17 PROCEDURE — 1036F TOBACCO NON-USER: CPT | Performed by: UROLOGY

## 2022-10-17 PROCEDURE — G8427 DOCREV CUR MEDS BY ELIG CLIN: HCPCS | Performed by: UROLOGY

## 2022-10-17 PROCEDURE — 99214 OFFICE O/P EST MOD 30 MIN: CPT | Performed by: UROLOGY

## 2022-10-17 PROCEDURE — G8420 CALC BMI NORM PARAMETERS: HCPCS | Performed by: UROLOGY

## 2022-10-17 NOTE — PROGRESS NOTES
Dr. Anat Bertrand MD  [facility])  Urology Clinic      Patient:  Jessy Calvo  YOB: 1990  Date: 10/17/2022    HISTORY OF PRESENT ILLNESS:   The patient is a 33 y.o. male who presents today for evaluation of the following problem(s): Zoons balanitis. We placed on 5-Fu and the spots have regressed completely. Overall the problem(s) : are improved. Associated Symptoms: No dysuria, gross hematuria. Pain Severity: 0/10    Summary of old records:   None    Imaging/Labs reviewed during today's visit:  I have independently reviewed and verified the following films during today's visit. Noen    Last several PSA's:  No results found for: PSA    Last total testosterone:  No results found for: TESTOSTERONE    Urinalysis today:  No results found for this visit on 10/17/22. Last BUN and creatinine:  Lab Results   Component Value Date    BUN 8 06/21/2019     Lab Results   Component Value Date    CREATININE 0.91 06/21/2019       Imaging Reviewed during this Office Visit:   (results were independently reviewed by physician and radiology report verified)    PAST MEDICAL, FAMILY AND SOCIAL HISTORY:  No past medical history on file. No past surgical history on file. Family History   Problem Relation Age of Onset    High Blood Pressure Father      Outpatient Medications Marked as Taking for the 10/17/22 encounter (Office Visit) with Anat Bertrand MD   Medication Sig Dispense Refill    hydrOXYzine HCl (ATARAX) 25 MG tablet TAKE 1 TABLET BY MOUTH EVERY DAY AT NIGHT AS NEEDED FOR INSOMNIA 90 tablet 0    Multiple Vitamin (MULTI-VITAMIN PO) Take by mouth daily         Patient has no known allergies.   Social History     Tobacco Use   Smoking Status Never   Smokeless Tobacco Never       Social History     Substance and Sexual Activity   Alcohol Use Not Currently       REVIEW OF SYSTEMS:  Constitutional: negative  Eyes: negative  Respiratory: negative  Cardiovascular: negative  Gastrointestinal: negative  Musculoskeletal: negative  Genitourinary: negative except for what is in HPI  Skin: negative   Neurological: negative  Hematological/Lymphatic: negative  Psychological: negative    Physical Exam:    This a 33 y.o. male   Vitals:    10/17/22 0744   BP: 108/74     Constitutional: Patient in no acute distress; Neuro: alert and oriented to person place and time. Psych: Mood and affect normal.  Skin: Normal  Lungs: Respiratory effort normal  Cardiovascular:  Normal peripheral pulses  Abdomen: Soft, non-tender, non-distended with no CVA, flank pain, hepatosplenomegaly or hernia. Kidneys normal.  Bladder non-tender and not distended. Lymphatics: no palpable lymphadenopathy  Penis normal and circumcised  Urethral meatus normal  Scrotal exam normal      Assessment and Plan      1. Zoon's balanitis    2. Penile pain             Plan:      No follow-ups on file. Did great on 5-FU. Ines Rogers has completely regressed. Keep medication and use only if lesions reappear. Call if lesions do not regress.           Dr. Chacha Green MD

## 2024-02-29 DIAGNOSIS — G47.00 INSOMNIA, UNSPECIFIED TYPE: Primary | ICD-10-CM

## 2024-03-01 NOTE — TELEPHONE ENCOUNTER
Patient's last appointment was : 7/26/2022 with our   Patient's next appointment is : Visit date not found   Last refilled on: 10/2023  Which pharmacy does the script need sent to: [facility]      No results found for: \"LABA1C\"  Lab Results   Component Value Date    CHOL 133 06/21/2019    TRIG 43 06/21/2019    HDL 68 06/21/2019    LDLCALC 56 06/21/2019     Lab Results   Component Value Date     06/21/2019    K 5.0 06/21/2019     06/21/2019    CO2 28 06/21/2019    BUN 8 06/21/2019    CREATININE 0.91 06/21/2019    GLUCOSE 85 06/21/2019    CALCIUM 9.7 06/21/2019    LABALBU 4.7 06/21/2019    BILITOT 2.0 (A) 06/21/2019    ALKPHOS 45 06/21/2019    AST 21 06/21/2019    ALT 12 06/21/2019    LABGLOM  06/21/2019      Comment:      eGFR>60     No results found for: \"TSH\", \"M9UJBBL\", \"D8VZPGA\", \"THYROIDAB\", \"FT3\", \"T4FREE\"  Lab Results   Component Value Date    WBC 4.4 06/21/2019    HGB 13.9 06/21/2019    HCT 40.2 (A) 06/21/2019    MCV 88.7 06/21/2019     06/21/2019

## 2024-03-02 RX ORDER — HYDROXYZINE HYDROCHLORIDE 25 MG/1
TABLET, FILM COATED ORAL
Qty: 90 TABLET | Refills: 0 | Status: SHIPPED | OUTPATIENT
Start: 2024-03-02

## 2024-03-03 NOTE — TELEPHONE ENCOUNTER
Patient on atarax for insomnia, last filled by Dr. Jackson.  Script sent to pharmacy.  Thank you,  Electronically signed by Kervin Gimenez MD on 3/2/2024 at 9:53 PM